# Patient Record
Sex: MALE | Race: OTHER | Employment: FULL TIME | URBAN - METROPOLITAN AREA
[De-identification: names, ages, dates, MRNs, and addresses within clinical notes are randomized per-mention and may not be internally consistent; named-entity substitution may affect disease eponyms.]

---

## 2023-11-17 ENCOUNTER — LAB (OUTPATIENT)
Dept: LAB | Facility: LAB | Age: 28
End: 2023-11-17
Payer: COMMERCIAL

## 2023-11-17 ENCOUNTER — OFFICE VISIT (OUTPATIENT)
Dept: ORTHOPEDIC SURGERY | Facility: HOSPITAL | Age: 28
End: 2023-11-17
Payer: COMMERCIAL

## 2023-11-17 ENCOUNTER — HOSPITAL ENCOUNTER (OUTPATIENT)
Dept: RADIOLOGY | Facility: HOSPITAL | Age: 28
Discharge: HOME | End: 2023-11-17
Payer: COMMERCIAL

## 2023-11-17 VITALS
HEIGHT: 77 IN | BODY MASS INDEX: 36.6 KG/M2 | HEART RATE: 83 BPM | WEIGHT: 310 LBS | SYSTOLIC BLOOD PRESSURE: 138 MMHG | DIASTOLIC BLOOD PRESSURE: 73 MMHG

## 2023-11-17 DIAGNOSIS — Z02.5 SPORTS PHYSICAL: ICD-10-CM

## 2023-11-17 LAB
25(OH)D3 SERPL-MCNC: 40 NG/ML (ref 30–100)
ABO GROUP (TYPE) IN BLOOD: NORMAL
ALBUMIN SERPL BCP-MCNC: 4.9 G/DL (ref 3.4–5)
ALP SERPL-CCNC: 44 U/L (ref 33–120)
ALT SERPL W P-5'-P-CCNC: 58 U/L (ref 10–52)
AMORPH CRY #/AREA UR COMP ASSIST: ABNORMAL /HPF
ANION GAP SERPL CALC-SCNC: 12 MMOL/L (ref 10–20)
ANTIBODY SCREEN: NORMAL
APPEARANCE UR: ABNORMAL
AST SERPL W P-5'-P-CCNC: 32 U/L (ref 9–39)
BASOPHILS # BLD AUTO: 0.05 X10*3/UL (ref 0–0.1)
BASOPHILS NFR BLD AUTO: 0.5 %
BILIRUB SERPL-MCNC: 0.9 MG/DL (ref 0–1.2)
BILIRUB UR STRIP.AUTO-MCNC: NEGATIVE MG/DL
BUN SERPL-MCNC: 14 MG/DL (ref 6–23)
CALCIUM SERPL-MCNC: 9.9 MG/DL (ref 8.6–10.3)
CHLORIDE SERPL-SCNC: 102 MMOL/L (ref 98–107)
CHOLEST SERPL-MCNC: 189 MG/DL (ref 0–199)
CHOLESTEROL/HDL RATIO: 4.9
CO2 SERPL-SCNC: 27 MMOL/L (ref 21–32)
COLOR UR: YELLOW
CREAT SERPL-MCNC: 1.36 MG/DL (ref 0.5–1.3)
EOSINOPHIL # BLD AUTO: 0.03 X10*3/UL (ref 0–0.7)
EOSINOPHIL NFR BLD AUTO: 0.3 %
ERYTHROCYTE [DISTWIDTH] IN BLOOD BY AUTOMATED COUNT: 12.2 % (ref 11.5–14.5)
FERRITIN SERPL-MCNC: 234 NG/ML (ref 20–300)
GFR SERPL CREATININE-BSD FRML MDRD: 74 ML/MIN/1.73M*2
GLUCOSE SERPL-MCNC: 81 MG/DL (ref 74–99)
GLUCOSE UR STRIP.AUTO-MCNC: NEGATIVE MG/DL
HCT VFR BLD AUTO: 44.7 % (ref 41–52)
HCV AB SER QL: NONREACTIVE
HDLC SERPL-MCNC: 38.7 MG/DL
HGB BLD-MCNC: 14.9 G/DL (ref 13.5–17.5)
HYALINE CASTS #/AREA URNS AUTO: ABNORMAL /LPF
IMM GRANULOCYTES # BLD AUTO: 0.07 X10*3/UL (ref 0–0.7)
IMM GRANULOCYTES NFR BLD AUTO: 0.7 % (ref 0–0.9)
KETONES UR STRIP.AUTO-MCNC: NEGATIVE MG/DL
LDLC SERPL CALC-MCNC: 126 MG/DL
LEUKOCYTE ESTERASE UR QL STRIP.AUTO: NEGATIVE
LYMPHOCYTES # BLD AUTO: 1.18 X10*3/UL (ref 1.2–4.8)
LYMPHOCYTES NFR BLD AUTO: 12.6 %
MCH RBC QN AUTO: 28.4 PG (ref 26–34)
MCHC RBC AUTO-ENTMCNC: 33.3 G/DL (ref 32–36)
MCV RBC AUTO: 85 FL (ref 80–100)
MONOCYTES # BLD AUTO: 0.62 X10*3/UL (ref 0.1–1)
MONOCYTES NFR BLD AUTO: 6.6 %
MUCOUS THREADS #/AREA URNS AUTO: ABNORMAL /LPF
NEUTROPHILS # BLD AUTO: 7.42 X10*3/UL (ref 1.2–7.7)
NEUTROPHILS NFR BLD AUTO: 79.3 %
NITRITE UR QL STRIP.AUTO: NEGATIVE
NON HDL CHOLESTEROL: 150 MG/DL (ref 0–149)
NRBC BLD-RTO: 0 /100 WBCS (ref 0–0)
PH UR STRIP.AUTO: 5 [PH]
PLATELET # BLD AUTO: 309 X10*3/UL (ref 150–450)
POTASSIUM SERPL-SCNC: 4 MMOL/L (ref 3.5–5.3)
PROT SERPL-MCNC: 7.5 G/DL (ref 6.4–8.2)
PROT UR STRIP.AUTO-MCNC: ABNORMAL MG/DL
RBC # BLD AUTO: 5.24 X10*6/UL (ref 4.5–5.9)
RBC # UR STRIP.AUTO: NEGATIVE /UL
RBC #/AREA URNS AUTO: ABNORMAL /HPF
RH FACTOR (ANTIGEN D): NORMAL
SODIUM SERPL-SCNC: 137 MMOL/L (ref 136–145)
SP GR UR STRIP.AUTO: 1.02
TRIGL SERPL-MCNC: 120 MG/DL (ref 0–149)
TSH SERPL-ACNC: 1.1 MIU/L (ref 0.44–3.98)
URATE SERPL-MCNC: 9.9 MG/DL (ref 4–7.5)
UROBILINOGEN UR STRIP.AUTO-MCNC: <2 MG/DL
VLDL: 24 MG/DL (ref 0–40)
WBC # BLD AUTO: 9.4 X10*3/UL (ref 4.4–11.3)
WBC #/AREA URNS AUTO: ABNORMAL /HPF

## 2023-11-17 PROCEDURE — 86900 BLOOD TYPING SEROLOGIC ABO: CPT | Mod: NFL

## 2023-11-17 PROCEDURE — 81001 URINALYSIS AUTO W/SCOPE: CPT | Mod: NFL

## 2023-11-17 PROCEDURE — 73130 X-RAY EXAM OF HAND: CPT | Mod: RT,FY,398

## 2023-11-17 PROCEDURE — 82728 ASSAY OF FERRITIN: CPT | Mod: NFL

## 2023-11-17 PROCEDURE — 72040 X-RAY EXAM NECK SPINE 2-3 VW: CPT | Mod: FY,398

## 2023-11-17 PROCEDURE — 1036F TOBACCO NON-USER: CPT | Performed by: FAMILY MEDICINE

## 2023-11-17 PROCEDURE — 36415 COLL VENOUS BLD VENIPUNCTURE: CPT

## 2023-11-17 PROCEDURE — 73070 X-RAY EXAM OF ELBOW: CPT | Mod: RT,FY,398

## 2023-11-17 PROCEDURE — 84550 ASSAY OF BLOOD/URIC ACID: CPT | Mod: NFL

## 2023-11-17 PROCEDURE — 86901 BLOOD TYPING SEROLOGIC RH(D): CPT | Mod: NFL

## 2023-11-17 PROCEDURE — 85025 COMPLETE CBC W/AUTO DIFF WBC: CPT | Mod: NFL

## 2023-11-17 PROCEDURE — 71046 X-RAY EXAM CHEST 2 VIEWS: CPT | Mod: 398

## 2023-11-17 PROCEDURE — 80053 COMPREHEN METABOLIC PANEL: CPT | Mod: NFL

## 2023-11-17 PROCEDURE — 72100 X-RAY EXAM L-S SPINE 2/3 VWS: CPT | Mod: NFL | Performed by: RADIOLOGY

## 2023-11-17 PROCEDURE — 86803 HEPATITIS C AB TEST: CPT | Mod: NFL

## 2023-11-17 PROCEDURE — 72100 X-RAY EXAM L-S SPINE 2/3 VWS: CPT | Mod: FY,398

## 2023-11-17 PROCEDURE — 80061 LIPID PANEL: CPT | Mod: NFL

## 2023-11-17 PROCEDURE — 99215 OFFICE O/P EST HI 40 MIN: CPT | Mod: 25 | Performed by: FAMILY MEDICINE

## 2023-11-17 PROCEDURE — 73030 X-RAY EXAM OF SHOULDER: CPT | Mod: NFL | Performed by: RADIOLOGY

## 2023-11-17 PROCEDURE — 72040 X-RAY EXAM NECK SPINE 2-3 VW: CPT | Mod: NFL | Performed by: RADIOLOGY

## 2023-11-17 PROCEDURE — 73030 X-RAY EXAM OF SHOULDER: CPT | Mod: LT,FY,398

## 2023-11-17 PROCEDURE — 82306 VITAMIN D 25 HYDROXY: CPT | Mod: NFL

## 2023-11-17 PROCEDURE — 84443 ASSAY THYROID STIM HORMONE: CPT | Mod: NFL

## 2023-11-17 PROCEDURE — 86850 RBC ANTIBODY SCREEN: CPT | Mod: NFL

## 2023-11-17 PROCEDURE — 82960 TEST FOR G6PD ENZYME: CPT | Mod: NFL

## 2023-11-17 PROCEDURE — 83020 HEMOGLOBIN ELECTROPHORESIS: CPT | Mod: NFL

## 2023-11-17 PROCEDURE — 99205 OFFICE O/P NEW HI 60 MIN: CPT | Performed by: FAMILY MEDICINE

## 2023-11-17 PROCEDURE — 83021 HEMOGLOBIN CHROMOTOGRAPHY: CPT | Mod: NFL

## 2023-11-17 NOTE — PROGRESS NOTES
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  Remote history of MRSA abscess of right thigh in 2013 that required admission to hospital, I&D and IV antibiotics for treatment.  No recurrent MRSA infections since then. He denies history of heat illness. He reports no previous concussions. He has no history of asthma. He has no history of ADHD. He has no known drug allergies. He takes no prescription medications. He does uses creatine supplementation.  He has no other complaints today.    Exam:  CONSTITUTIONAL  General appearance: Alert and in no acute distress. Well developed, well nourished.   HEAD AND FACE  Head and face: Normal.    EYES  External Eye, Conjunctiva and Lids: Normal external exam - pupils were equal in size, round, reactive to light (PERRL) with normal accommodation and extraocular movements intact (EOMI).    Pupils and irises: Normal.    EARS, NOSE, MOUTH, AND THROAT  External inspection of ears and nose: Normal.     Hearing: Normal.     Nasal mucosa, septum, and turbinates: Normal.     Lips, teeth, and gums: Normal.     Oropharynx: Normal.    NECK  Neck: No neck mass was observed. Supple.    Thyroid: Not enlarged and there were no palpable thyroid nodules.   PULMONARY  Respiratory effort: No respiratory distress.    Auscultation of lungs: Clear bilateral breath sounds.   CARDIOVASCULAR  Auscultation of heart: Heart rate and rhythm were normal, normal S1 and S2, no gallops, no murmurs and no pericardial rub.    Femoral pulses: Normal.     Pedal pulses: Normal.    Peripheral vascular exam: Normal.     Examination of extremities: No peripheral edema.   ABDOMEN  Abdomen: Normal bowel sounds, soft nontender; no abdominal mass palpated. No rebound, rigidity or guarding.    Liver and spleen: No hepatosplenomegaly.    Examination for hernias: No hernias.   GENITOURINARY  Scrotal contents: Normal. The testicles were not swollen and there were no testicular masses.     Penis: No penile abnormalities.   LYMPHATIC  Palpation of lymph nodes in neck: No lymphadenopathy.   MUSCULOSKELETAL  Gait and station: Normal.    Digits and nails: No clubbing or cyanosis of the fingernails.    Inspection/palpation of joints, bones, and muscles: No joint swelling seen, normal movements of all extremities.    Range of motion: Normal.     Stability: Normal.     Muscle strength/tone: Normal.    SKIN  Skin and subcutaneous tissue: Normal skin color and pigmentation, normal skin turgor, and no rash.    Palpation of skin and subcutaneous tissue: Normal.    NEUROLOGIC  Cranial nerves: 2-12 grossly intact.    Cortical function: Normal.     Sensation: Normal.     Coordination: Normal.    PSYCHIATRIC  Judgment and insight: Intact.    Orientation to person, place, and time: Alert and oriented x 3.    Recent and remote memory: Normal.     Mood and affect: Normal.      Results:  Chest x-ray: two-view chest x-ray obtained today are without acute abnormality.    EKG: Normal sinus rhythm, no other abnormalities.     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  Remote history of MRSA abscess of right thigh in 2013 that required admission to hospital, I&D and IV antibiotics for treatment.  No recurrent MRSA infections since then. He denies history of heat illness. He reports no previous concussions. He has no history of asthma. He has no history of ADHD. He has no known drug allergies. He takes no prescription medications. He does uses creatine supplementation.  He has no other complaints today.    He was found to have a normal physical exam, EKG and chest x-ray today. He needs no further evaluation and is cleared for full participation.

## 2023-11-18 LAB — G6PD RBC QL: NORMAL

## 2023-11-21 LAB
HEMOGLOBIN A2: 3.1 % (ref 2–3.5)
HEMOGLOBIN A: 96.6 % (ref 95.8–98)
HEMOGLOBIN F: 0.3 % (ref 0–2)
HEMOGLOBIN IDENTIFICATION INTERPRETATION: NORMAL
PATH REVIEW-HGB IDENTIFICATION: NORMAL